# Patient Record
Sex: FEMALE | URBAN - METROPOLITAN AREA
[De-identification: names, ages, dates, MRNs, and addresses within clinical notes are randomized per-mention and may not be internally consistent; named-entity substitution may affect disease eponyms.]

---

## 2024-11-09 ENCOUNTER — APPOINTMENT (OUTPATIENT)
Dept: FAMILY MEDICINE | Age: 24
End: 2024-11-09

## 2024-12-02 ENCOUNTER — TELEPHONE (OUTPATIENT)
Dept: FAMILY MEDICINE | Age: 24
End: 2024-12-02

## 2025-01-05 SDOH — ECONOMIC STABILITY: TRANSPORTATION INSECURITY
IN THE PAST 12 MONTHS, HAS LACK OF RELIABLE TRANSPORTATION KEPT YOU FROM MEDICAL APPOINTMENTS, MEETINGS, WORK OR FROM GETTING THINGS NEEDED FOR DAILY LIVING?: NO

## 2025-01-05 SDOH — ECONOMIC STABILITY: FOOD INSECURITY: WITHIN THE PAST 12 MONTHS, THE FOOD YOU BOUGHT JUST DIDN'T LAST AND YOU DIDN'T HAVE MONEY TO GET MORE.: NEVER TRUE

## 2025-01-05 SDOH — ECONOMIC STABILITY: HOUSING INSECURITY: WHAT IS YOUR LIVING SITUATION TODAY?: I HAVE A STEADY PLACE TO LIVE

## 2025-01-05 SDOH — ECONOMIC STABILITY: GENERAL: WOULD YOU LIKE HELP WITH ANY OF THE FOLLOWING NEEDS?: I DON'T WANT HELP WITH ANY OF THESE

## 2025-01-05 SDOH — ECONOMIC STABILITY: HOUSING INSECURITY: DO YOU HAVE PROBLEMS WITH ANY OF THE FOLLOWING?: PATIENT DECLINED

## 2025-01-05 ASSESSMENT — SOCIAL DETERMINANTS OF HEALTH (SDOH): IN THE PAST 12 MONTHS, HAS THE ELECTRIC, GAS, OIL, OR WATER COMPANY THREATENED TO SHUT OFF SERVICE IN YOUR HOME?: NO

## 2025-01-09 ENCOUNTER — APPOINTMENT (OUTPATIENT)
Dept: FAMILY MEDICINE | Age: 25
End: 2025-01-09

## 2025-02-08 ENCOUNTER — APPOINTMENT (OUTPATIENT)
Dept: FAMILY MEDICINE | Age: 25
End: 2025-02-08

## 2025-05-12 ENCOUNTER — OFFICE VISIT (OUTPATIENT)
Dept: OBGYN CLINIC | Facility: CLINIC | Age: 25
End: 2025-05-12
Payer: COMMERCIAL

## 2025-05-12 VITALS
DIASTOLIC BLOOD PRESSURE: 77 MMHG | HEART RATE: 65 BPM | WEIGHT: 157 LBS | BODY MASS INDEX: 21.26 KG/M2 | SYSTOLIC BLOOD PRESSURE: 114 MMHG | HEIGHT: 72 IN

## 2025-05-12 DIAGNOSIS — Z01.419 ENCOUNTER FOR ANNUAL ROUTINE GYNECOLOGICAL EXAMINATION: Primary | ICD-10-CM

## 2025-05-12 RX ORDER — ONDANSETRON 4 MG/1
TABLET, FILM COATED ORAL
COMMUNITY
Start: 2025-02-14 | End: 2025-10-12

## 2025-05-12 RX ORDER — ALBUTEROL SULFATE 90 UG/1
2 INHALANT RESPIRATORY (INHALATION)
COMMUNITY
Start: 2024-05-28

## 2025-05-12 NOTE — PROGRESS NOTES
Chief Complaint:   Chief Complaint   Patient presents with    Annual     Annual; moved to [facility], off of BC for about 4 months now,  undecided on being back on birth control. Had pap 10/11/2023        HPI:     Shirley is 24 year old female, here today for annual exam  Patient's last menstrual period was 05/11/2025 (exact date).. Menses regular.  Hx Prior Abnormal Pap: No  Pap Date: 10/11/23  Pap Result Notes: normal   Denies abnormal discharge or vaginal irritation.     Stopped birth control pills in December. Had been on pill since 17yo. Periods have been regular since being off pills. Having breakouts.     Denies history of migraines with aura and personal/family history of blood clots/stroke    Current Partners: No current partners. In past with male. Has not been sexually active in the last 2-3 years  Birth Control Method:n/a  H/O of STI's: no  Last STI screen: 2023  Declines this testing today. Has not been sexually active since last testing  Additional information:      Last Pap: 10/11/23 NILM      H/O abnormal Pap: n/a      Last mammogram (if applicable): n/a  Denies family history of breast or ovarian cancer.    Socially drinks  Denies smoking   Denies drug use     Patient offered a chaperone for exam. Patient declines    HISTORY:  Past Medical History[1]   Past Surgical History[2]   Family History[3]   Social History: Short Social Hx on File[4]     Medications (Active prior to today's visit):  Current Medications[5]    Allergies:  Allergies[6]    HISTORY:  Past Medical History[7]   Past Surgical History[8]   Family History[9]   Social History: Short Social Hx on File[10]     Medications (Active prior to today's visit):  Current Medications[11]    Allergies:  Allergies[12]       ROS:     Cardiovascular:  Negative forirregular heartbeat/palpitations. Negative for chest pain  Constitutional:  Negative for decreased activity, fever, irritability and lethargy  Gastrointestinal:  Negative for  abdominal pain, constipation, decreased appetite, diarrhea and vomiting  Genitourinary:  Negative for dysuria and hematuria  Reproductive: Negative for vaginal discharge, itching, abnormal bleeding  Hema/Lymph:  Negative for easy bleeding and easy bruising  Neurological:  Negative for gait disturbance or dizziness  Psychiatric:  Negative for inappropriate interaction and psychiatric symptoms  Respiratory:  Negative for cough, dyspnea and wheezing      PHYSICAL EXAM:   Constitutional: appears well hydrated alert and responsive no acute distress noted  Neck/Thyroid: neck is supple without adenopathy No thyromegaly  Lymphatic: no abnormal cervical, supraclavicular or axillary adenopathy is noted  Respiratory: normal to inspection lungs are clear to auscultation bilaterally normal respiratory effort  Cardiovascular: regular rate and rhythm no murmurs, gallups, or rubs  Abdomen: soft non-tender non-distended no organomegaly noted no masses  Genitourinary: Deferred. Pap up to date and without concern today  Musculoskeletal: Normal strength, no swelling  Integumentary: Warm, Dry, appropriate color, Intact  Neurologic: Alert, Oriented  Psychiatric: Cooperative, appropriate mood and affect            ASSESSMENT/PLAN:   Assessment   Encounter Diagnosis   Name Primary?    Encounter for annual routine gynecological examination Yes       Normal gyne exam. Pap deferred -pap up to date. Next pap due 10/2026  STD testing- declined  Diet/excercise discussed  Discussed breast self awareness  Recommend multivitamin with folic acid  She is considering restarting birth control but does not want to today. Instructed her to call if she desires to restart         Orders This Visit:  No orders of the defined types were placed in this encounter.      Meds This Visit:  Requested Prescriptions      No prescriptions requested or ordered in this encounter       Imaging & Referrals:  None     5/12/2025  Giana Pickett CNM      Return to care in 1  year        [1]   Past Medical History:   Decorative tattoo   [2]   Past Surgical History:  Procedure Laterality Date    Bruc[place] teeth removed Bilateral     december 9th 2021   [3]   Family History  Problem Relation Age of Onset    Skin cancer Father    [4]   Social History  Socioeconomic History    Marital status: Single   Tobacco Use    Smoking status: Never    Smokeless tobacco: Never   Substance and Sexual Activity    Alcohol use: Yes     Comment: social    Drug use: Never    Sexual activity: Not Currently     Social Drivers of Health     Food Insecurity: Low Risk  (1/5/2025)    Received from [facility]    Food Insecurity     Within the past 12 months, you worried that your food would run out before you got money to buy more.  : Never true     Within the past 12 months, the food you bought just didn't last and you didn't have money to get more. : Never true   Transportation Needs: Not At Risk (1/5/2025)    Received from [facility]    Transportation Needs     In the past 12 months, has lack of reliable transportation kept you from medical appointments, meetings, work or from getting things needed for daily living? : No   [5]   Current Outpatient Medications   Medication Sig Dispense Refill    albuterol 108 (90 Base) MCG/ACT Inhalation Aero Soln Inhale 2 puffs into the lungs.      ondansetron (ZOFRAN) 4 mg tablet Take 1 tablet (4 mg) by mouth every 8-12 hr as needed for nausea     [6] Not on File  [7]   Past Medical History:   Decorative tattoo   [8]   Past Surgical History:  Procedure Laterality Date    Bruc[place] teeth removed Bilateral     december 9th 2021   [9]   Family History  Problem Relation Age of Onset    Skin cancer Father    [10]   Social History  Socioeconomic History    Marital status: Single   Tobacco Use    Smoking status: Never    Smokeless tobacco: Never   Substance and Sexual Activity    Alcohol use: Yes     Comment: social    Drug use: Never    Sexual activity: Not Currently      Social Drivers of Health     Food Insecurity: Low Risk  (1/5/2025)    Received from [facility]    Food Insecurity     Within the past 12 months, you worried that your food would run out before you got money to buy more.  : Never true     Within the past 12 months, the food you bought just didn't last and you didn't have money to get more. : Never true   Transportation Needs: Not At Risk (1/5/2025)    Received from [facility]    Transportation Needs     In the past 12 months, has lack of reliable transportation kept you from medical appointments, meetings, work or from getting things needed for daily living? : No   [11]   Current Outpatient Medications   Medication Sig Dispense Refill    albuterol 108 (90 Base) MCG/ACT Inhalation Aero Soln Inhale 2 puffs into the lungs.      ondansetron (ZOFRAN) 4 mg tablet Take 1 tablet (4 mg) by mouth every 8-12 hr as needed for nausea     [12] Not on File

## 2025-08-04 ENCOUNTER — APPOINTMENT (OUTPATIENT)
Dept: OBGYN | Age: 25
End: 2025-08-04